# Patient Record
Sex: FEMALE | HISPANIC OR LATINO
[De-identification: names, ages, dates, MRNs, and addresses within clinical notes are randomized per-mention and may not be internally consistent; named-entity substitution may affect disease eponyms.]

---

## 2017-08-28 ENCOUNTER — CHARTING TRANS (OUTPATIENT)
Dept: OTHER | Age: 57
End: 2017-08-28

## 2017-08-30 ENCOUNTER — LAB SERVICES (OUTPATIENT)
Dept: OTHER | Age: 57
End: 2017-08-30

## 2017-08-30 ENCOUNTER — CHARTING TRANS (OUTPATIENT)
Dept: OTHER | Age: 57
End: 2017-08-30

## 2017-08-30 LAB — SKIN TEST, TB IN-OFFICE: NEGATIVE

## 2018-10-03 ENCOUNTER — HOSPITAL (OUTPATIENT)
Dept: OTHER | Age: 58
End: 2018-10-03
Attending: EMERGENCY MEDICINE

## 2018-10-03 LAB
ALBUMIN SERPL-MCNC: 3.8 GM/DL (ref 3.6–5.1)
ALBUMIN/GLOB SERPL: 0.9 {RATIO} (ref 1–2.4)
ALP SERPL-CCNC: 131 UNIT/L (ref 45–117)
ALT SERPL-CCNC: 30 UNIT/L
ANALYZER ANC (IANC): NORMAL
ANION GAP SERPL CALC-SCNC: 14 MMOL/L (ref 10–20)
AST SERPL-CCNC: 21 UNIT/L
BASOPHILS # BLD: 0 THOUSAND/MCL (ref 0–0.3)
BASOPHILS NFR BLD: 0 %
BILIRUB SERPL-MCNC: 0.6 MG/DL (ref 0.2–1)
BUN SERPL-MCNC: 15 MG/DL (ref 6–20)
BUN/CREAT SERPL: 23 (ref 7–25)
CALCIUM SERPL-MCNC: 9.5 MG/DL (ref 8.4–10.2)
CHLORIDE: 104 MMOL/L (ref 98–107)
CHOLEST SERPL-MCNC: 151 MG/DL
CHOLEST/HDLC SERPL: 3.2 {RATIO}
CO2 SERPL-SCNC: 27 MMOL/L (ref 21–32)
CREAT SERPL-MCNC: 0.66 MG/DL (ref 0.51–0.95)
DIFFERENTIAL METHOD BLD: NORMAL
EOSINOPHIL # BLD: 0.1 THOUSAND/MCL (ref 0.1–0.5)
EOSINOPHIL NFR BLD: 1 %
ERYTHROCYTE [DISTWIDTH] IN BLOOD: 13.9 % (ref 11–15)
GLOBULIN SER-MCNC: 4.4 GM/DL (ref 2–4)
GLUCOSE SERPL-MCNC: 96 MG/DL (ref 65–99)
HDLC SERPL-MCNC: 47 MG/DL
HEMATOCRIT: 40.6 % (ref 36–46.5)
HGB BLD-MCNC: 13.5 GM/DL (ref 12–15.5)
LDLC SERPL CALC-MCNC: 81 MG/DL
LIPASE SERPL-CCNC: 210 UNIT/L (ref 73–393)
LYMPHOCYTES # BLD: 2 THOUSAND/MCL (ref 1–4)
LYMPHOCYTES NFR BLD: 34 %
MCH RBC QN AUTO: 29.7 PG (ref 26–34)
MCHC RBC AUTO-ENTMCNC: 33.3 GM/DL (ref 32–36.5)
MCV RBC AUTO: 89.4 FL (ref 78–100)
MONOCYTES # BLD: 0.4 THOUSAND/MCL (ref 0.3–0.9)
MONOCYTES NFR BLD: 7 %
NEUTROPHILS # BLD: 3.5 THOUSAND/MCL (ref 1.8–7.7)
NEUTROPHILS NFR BLD: 58 %
NEUTS SEG NFR BLD: NORMAL %
NONHDLC SERPL-MCNC: 104 MG/DL
NRBC (NRBCRE): NORMAL
PLATELET # BLD: 270 THOUSAND/MCL (ref 140–450)
POTASSIUM SERPL-SCNC: 4 MMOL/L (ref 3.4–5.1)
PROT SERPL-MCNC: 8.2 GM/DL (ref 6.4–8.2)
RBC # BLD: 4.54 MILLION/MCL (ref 4–5.2)
SODIUM SERPL-SCNC: 141 MMOL/L (ref 135–145)
TRIGLYCERIDE (TRIGP): 115 MG/DL
TROPONIN I SERPL HS-MCNC: <0.02 NG/ML
TROPONIN I SERPL HS-MCNC: <0.02 NG/ML
WBC # BLD: 6 THOUSAND/MCL (ref 4.2–11)

## 2021-08-12 ENCOUNTER — WALK IN (OUTPATIENT)
Dept: URGENT CARE | Age: 61
End: 2021-08-12

## 2021-08-12 DIAGNOSIS — Z11.1 ENCOUNTER FOR SCREENING FOR RESPIRATORY TUBERCULOSIS: Primary | ICD-10-CM

## 2021-08-12 PROCEDURE — 86580 TB INTRADERMAL TEST: CPT | Performed by: NURSE PRACTITIONER

## 2021-08-14 ENCOUNTER — WALK IN (OUTPATIENT)
Dept: URGENT CARE | Age: 61
End: 2021-08-14

## 2021-08-14 VITALS
HEART RATE: 80 BPM | SYSTOLIC BLOOD PRESSURE: 120 MMHG | HEIGHT: 61 IN | DIASTOLIC BLOOD PRESSURE: 70 MMHG | RESPIRATION RATE: 20 BRPM | TEMPERATURE: 98.2 F | WEIGHT: 153 LBS | BODY MASS INDEX: 28.89 KG/M2

## 2021-08-14 DIAGNOSIS — Z11.1 SCREENING-PULMONARY TB: Primary | ICD-10-CM

## 2021-08-14 DIAGNOSIS — Z02.1 PRE-EMPLOYMENT EXAMINATION: Primary | ICD-10-CM

## 2021-08-14 LAB
INDURATION: NORMAL MM (ref 0–?)
INDURATION: NORMAL MM (ref 0–?)
SKIN TEST RESULT: NEGATIVE
SKIN TEST RESULT: NEGATIVE

## 2021-08-14 PROCEDURE — X0945 SELF PAY APN OR PA PERFORMED ADMINISTRATIVE PHYSICAL: HCPCS | Performed by: NURSE PRACTITIONER

## 2021-08-14 PROCEDURE — 86580 TB INTRADERMAL TEST: CPT | Performed by: NURSE PRACTITIONER

## 2021-08-14 ASSESSMENT — ENCOUNTER SYMPTOMS
RESPIRATORY NEGATIVE: 1
HEMATOLOGIC/LYMPHATIC NEGATIVE: 1
EYES NEGATIVE: 1
ALLERGIC/IMMUNOLOGIC NEGATIVE: 1
ENDOCRINE NEGATIVE: 1
CONSTITUTIONAL NEGATIVE: 1
NEUROLOGICAL NEGATIVE: 1
PSYCHIATRIC NEGATIVE: 1
GASTROINTESTINAL NEGATIVE: 1

## 2021-08-14 ASSESSMENT — VISUAL ACUITY
OS_CC: 20/30
OD_CC: 20/20

## 2021-09-02 ENCOUNTER — TELEPHONE (OUTPATIENT)
Dept: SCHEDULING | Age: 61
End: 2021-09-02

## 2021-11-09 ENCOUNTER — HOSPITAL ENCOUNTER (OUTPATIENT)
Dept: GENERAL RADIOLOGY | Facility: HOSPITAL | Age: 61
Discharge: HOME | End: 2021-11-09
Attending: PODIATRIST
Payer: COMMERCIAL

## 2021-11-09 ENCOUNTER — OFFICE VISIT (OUTPATIENT)
Dept: PODIATRY CLINIC | Facility: CLINIC | Age: 61
End: 2021-11-09
Payer: COMMERCIAL

## 2021-11-09 DIAGNOSIS — M72.2 PLANTAR FASCIITIS OF LEFT FOOT: ICD-10-CM

## 2021-11-09 DIAGNOSIS — M79.672 PAIN OF LEFT HEEL: ICD-10-CM

## 2021-11-09 DIAGNOSIS — M79.672 PAIN OF LEFT HEEL: Primary | ICD-10-CM

## 2021-11-09 PROCEDURE — 73630 X-RAY EXAM OF FOOT: CPT | Performed by: PODIATRIST

## 2021-11-09 PROCEDURE — 99203 OFFICE O/P NEW LOW 30 MIN: CPT | Performed by: PODIATRIST

## 2021-11-09 PROCEDURE — L3060 FOOT ARCH SUPP LONGITUD/META: HCPCS | Performed by: PODIATRIST

## 2021-11-09 RX ORDER — LEVOTHYROXINE SODIUM 0.03 MG/1
TABLET ORAL
COMMUNITY

## 2021-11-09 RX ORDER — CETIRIZINE HYDROCHLORIDE 10 MG/1
TABLET ORAL
COMMUNITY

## 2021-11-09 NOTE — PROGRESS NOTES
HPI:    Patient ID: Myrla Gilford is a 59year old female. Is an 22-year-old female presents as new patient to me on consult from [address] [place] [address]. Patient's chief complaint is left heel pain. She states it has been a problem for as much as 4 years.   It is unrela instructed to wear shoes at all times meaning no barefoot walking. I also instructed her to use ibuprofen 400 mg 3 times per day with meals. I reviewed the use of the medication, concerns, and expectations.   She was instructed to ice the heel twice every

## 2021-11-13 ENCOUNTER — TELEPHONE (OUTPATIENT)
Dept: PODIATRY CLINIC | Facility: CLINIC | Age: 61
End: 2021-11-13

## 2024-12-27 ENCOUNTER — OFFICE VISIT (OUTPATIENT)
Facility: LOCATION | Age: 64
End: 2024-12-27

## 2024-12-27 VITALS — WEIGHT: 154 LBS | BODY MASS INDEX: 28.34 KG/M2 | HEIGHT: 62 IN

## 2024-12-27 DIAGNOSIS — H61.23 BILATERAL IMPACTED CERUMEN: Primary | ICD-10-CM

## 2024-12-27 DIAGNOSIS — J35.8 TONSILLAR CYST: ICD-10-CM

## 2024-12-27 DIAGNOSIS — R05.3 CHRONIC COUGH: ICD-10-CM

## 2024-12-27 DIAGNOSIS — R13.10 ODYNOPHAGIA: ICD-10-CM

## 2024-12-27 PROCEDURE — 1160F RVW MEDS BY RX/DR IN RCRD: CPT | Performed by: STUDENT IN AN ORGANIZED HEALTH CARE EDUCATION/TRAINING PROGRAM

## 2024-12-27 PROCEDURE — 1159F MED LIST DOCD IN RCRD: CPT | Performed by: STUDENT IN AN ORGANIZED HEALTH CARE EDUCATION/TRAINING PROGRAM

## 2024-12-27 PROCEDURE — 31575 DIAGNOSTIC LARYNGOSCOPY: CPT | Performed by: STUDENT IN AN ORGANIZED HEALTH CARE EDUCATION/TRAINING PROGRAM

## 2024-12-27 PROCEDURE — 3008F BODY MASS INDEX DOCD: CPT | Performed by: STUDENT IN AN ORGANIZED HEALTH CARE EDUCATION/TRAINING PROGRAM

## 2024-12-27 PROCEDURE — 69210 REMOVE IMPACTED EAR WAX UNI: CPT | Performed by: STUDENT IN AN ORGANIZED HEALTH CARE EDUCATION/TRAINING PROGRAM

## 2024-12-27 PROCEDURE — 99203 OFFICE O/P NEW LOW 30 MIN: CPT | Performed by: STUDENT IN AN ORGANIZED HEALTH CARE EDUCATION/TRAINING PROGRAM

## 2024-12-27 RX ORDER — PANTOPRAZOLE SODIUM 40 MG/1
40 TABLET, DELAYED RELEASE ORAL DAILY
COMMUNITY
Start: 2024-11-12

## 2024-12-27 RX ORDER — FLUTICASONE PROPIONATE 50 MCG
SPRAY, SUSPENSION (ML) NASAL
COMMUNITY
Start: 2022-12-09

## 2024-12-27 NOTE — PROGRESS NOTES
[facility]  OTOLARYNGOLOGY - HEAD & NECK SURGERY    12/27/2024     Reason for Consultation:   Throat issues    History of Present Illness:   Patient is a pleasant 64 year old female who is being seen for throat problems over the past few months.  The patient states that she has been treated multiple times for strep with antibiotics but this has not helped.  She continues to believe that she has strep because she sees a white spot on the left tonsil.  She also has a lot of mucus formation and feels of a burning in her chest at times.  She has been prescribed pantoprazole but takes it irregularly.  She is trying to avoid spicy foods, but drinks tea at night and also drinks milk right before bed.  She is also had some discomfort in her left ear.  She has gargled with bicarbonate which has helped slightly.  Overall her throat pain has improved over the past few weeks.    Past Medical History  Past Medical History:    Hypothyroidism    Prediabetes       Past Surgical History  Past Surgical History:   Procedure Laterality Date    Hysterectomy  06/28/2023    partial hysterectomy       Family History  History reviewed. No pertinent family history.    Social History  Pediatric History   Patient Parents    Not on file     Other Topics Concern    Not on file   Social History Narrative    Not on file           Current Medications:  Current Outpatient Medications   Medication Sig Dispense Refill    fluticasone propionate 50 MCG/ACT Nasal Suspension Spray 2 sprays every day by intranasal route.      pantoprazole 40 MG Oral Tab EC Take 1 tablet (40 mg total) by mouth daily.      Cholecalciferol 50 MCG (2000 UT) Oral Tab Vitamin D3 50 mcg (2,000 unit) tablet   Take 1 tablet every day by oral route.      levothyroxine 25 MCG Oral Tab levothyroxine 25 mcg tablet      cetirizine 10 MG Oral Tab cetirizine 10 mg tablet (Patient not taking: Reported on 12/27/2024)         Allergies  Allergies[1]    Review of Systems:   A comprehensive 10  point review of systems was completed.  Pertinent positives and negatives noted in the the HPI.    Physical Exam:   Height 5' 2\" (1.575 m), weight 154 lb (69.9 kg).    GENERAL: No acute distress, Comfortable appearing  FACE: HB 1/6, Normal Animation  HEAD: Normocephalic  EYES: EOMI, pupils equil  EARS: Bilateral Auricles Symmetric  NOSE: Nares patent bilaterally  ORAL CAVITY: Tongue mobile, Oropharynx clear, Floor of mouth clear, Posterior oropharynx normal  NECK: No palpable lymphadenopathy, thyroid not palpable, nontender    OTOMICROSCOPY WITH CERUMEN REMOVAL    Canals:  Right: Canal with cerumen preventing adequate view of TM, debrided with instrumentation as dictated below  Left: Canal with cerumen preventing adequate view of TM, debrided with instrumentation as dictated below    Tympanic Membranes:  Right: Normal tympanic membrane.   Left: Normal tympanic membrane.     TM Visualized Method:   Right TM examined via otomicroscopy.    Left TM examined via otomicroscopy.      PROCEDURE: REMOVAL OF CERUMEN IMPACTION  The cerumen impaction was completely removed from the bilateral ear canals using microscopy as necessary.   Removal was completed by using a mary daugherty, and pick    PROCEDURE: FLEXIBLE FIBEROPTIC LARYNGOSCOPY (67053)    Due to inability for adequate examination of the larynx and need for magnification to perform the examination, endoscopy was performed.  Risks and benefits were discussed with patient/family and they have given verbal consent to proceed.    Procedure Detail & Findings:     After placement of topical anesthetic intranasally the flexible laryngoscope was inserted into the nare and driven through the nasal cavity with no significant abnormal findings noted in the nasal cavities or nasopharynx. The oropharynx, hypopharynx and larynx were subsequently examined and the following findings were noted:    Base of Tongue: Normal    Valeculla: Normal    Arytenoids: Normal    Introitus of  the esophagus: Moderate edema pachydermia noted    Epiglottis: Normal    False vocal cords: Normal    True vocal cords: Normal    Subglottic space: Normal    Mobility of True vocal cords: Normal    Condition: Stable    Complications: Patient tolerated the procedure well with no immediate complication.      Results:     Laboratory Data:  No results found for: \"WBC\", \"HGB\", \"HCT\", \"PLT\", \"CREATSERUM\", \"BUN\", \"NA\", \"K\", \"CL\", \"CO2\", \"GLU\", \"CA\", \"ALB\", \"ALKPHO\", \"TP\", \"AST\", \"ALT\", \"PTT\", \"INR\", \"PTP\", \"T4F\", \"TSH\", \"TSHREFLEX\", \"DAMIAN\", \"LIP\", \"GGT\", \"PSA\", \"DDIMER\", \"ESRML\", \"ESRPF\", \"CRP\", \"BNP\", \"MG\", \"PHOS\", \"TROP\", \"CK\", \"CKMB\", \"SHAWN\", \"RPR\", \"B12\", \"ETOH\", \"POCGLU\"      Imaging:  No results found.      Impression:       ICD-10-CM    1. Bilateral impacted cerumen  H61.23       2. Chronic cough  R05.3       3. Odynophagia  R13.10       4. Tonsillar cyst  J35.8            Recommendations:  I have debrided the bilateral ear canals today and both ear exams appear normal  In terms of her sore throat I do think that there is a large component of laryngal pharyngeal reflux.  She has been prescribed PPI but has been taking it infrequently and only as needed.  I would like her to take this daily over the next 30 days  I have sent a throat culture to check but my suspicion for throat infection is quite low given the chronicity of the problem  She will return to see me in 1 month and we will follow-up cultures.    Thank you for allowing me to participate in the care of your patient.    Jose Ridley,    Otolaryngology/Rhinology, Sinus, and Endoscopic Skull Base Surgery  [address] [place] [address]  Phone [phone]  Fax [phone]  12/27/2024  10:15 AM  12/27/2024          [1] No Known Allergies

## 2025-01-16 NOTE — PROGRESS NOTES
Evon Iyer is a 65 year old female.  Chief Complaint   Patient presents with    Establish Care     HPI:   Patient presented today for establishment of care.  She states that she has been having pain in her left breast area.  Pain is mostly in the inner upper quadrant but it radiates to the whole breast.  It comes and goes.  She was seen by her previous physician and underwent diagnostic mammogram which was normal she also had a chest x-ray done which was normal.  She does have a birthmark in that area but states that it has not changed.    Patient also complains of constipation that has been ongoing.  But usually has 1 bowel movement a day but it is very hard.  Has been increasing her fluid intake also takes prune juice.    She states she is worried that her thyroid is not working well she takes 25 mg tablets.  She has noticed hair fall dry skin and constipation and is tired all the time wondering if it is because of her thyroid levels    She brought her most recent blood work with her which was reviewed with the patient  Constipation- uses once a day. BM is hard      Current Outpatient Medications   Medication Sig Dispense Refill    pantoprazole 40 MG Oral Tab EC Take 1 tablet (40 mg total) by mouth every morning before breakfast. 90 tablet 3    Cholecalciferol 50 MCG (2000 UT) Oral Tab Take 1 tablet (2,000 Units total) by mouth daily. 90 tablet 3    docusate sodium 100 MG Oral Cap Take 1 capsule (100 mg total) by mouth 2 (two) times daily as needed for constipation. 30 capsule 0    levothyroxine 25 MCG Oral Tab levothyroxine 25 mcg tablet      fluticasone propionate 50 MCG/ACT Nasal Suspension Spray 2 sprays every day by intranasal route. (Patient not taking: Reported on 1/16/2025)      cetirizine 10 MG Oral Tab cetirizine 10 mg tablet (Patient not taking: Reported on 11/9/2021)        Past Medical History:    Hypothyroidism    Prediabetes      Past Surgical History:   Procedure Laterality Date     Hysterectomy  06/28/2023    partial hysterectomy      Social History:  Social History     Socioeconomic History    Marital status: Unknown   Tobacco Use    Smoking status: Never    Smokeless tobacco: Never   Vaping Use    Vaping status: Never Used   Substance and Sexual Activity    Alcohol use: Never    Drug use: Never      History reviewed. No pertinent family history.   Allergies[1]     REVIEW OF SYSTEMS:   Review of Systems   Review of Systems   Constitutional: Negative for activity change, appetite change and fever.   HENT: Negative for congestion and voice change.    Respiratory: Negative for cough and shortness of breath.    Cardiovascular: Negative for chest pain.   Gastrointestinal: Negative for abdominal distention, abdominal pain and vomiting.   Genitourinary: Negative for hematuria.   Skin: Negative for wound.   Psychiatric/Behavioral: Negative for behavioral problems.   Wt Readings from Last 5 Encounters:   01/16/25 153 lb (69.4 kg)   12/27/24 154 lb (69.9 kg)     Body mass index is 31.98 kg/m².      EXAM:   /78   Pulse 65   Ht 4' 10\" (1.473 m)   Wt 153 lb (69.4 kg)   BMI 31.98 kg/m²   Physical Exam   Constitutional:       Appearance: Normal appearance.   HENT:      Head: Normocephalic.   Eyes:      Conjunctiva/sclera: Conjunctivae normal.   Cardiovascular:      Rate and Rhythm: Normal rate and regular rhythm.      Heart sounds: Normal heart sounds. No murmur heard.  Pulmonary:      Effort: Pulmonary effort is normal.      Breath sounds: Normal breath sounds. No rhonchi or rales.   Abdominal:      General: Bowel sounds are normal.      Palpations: Abdomen is soft.      Tenderness: There is no abdominal tenderness.   Musculoskeletal:      Cervical back: Neck supple.      Right lower leg: No edema.      Left lower leg: No edema.   Skin:     General: Skin is warm and dry.   Neurological:      General: No focal deficit present.      Mental Status: He is alert and oriented to person, place, and  time. Mental status is at baseline.   Psychiatric:         Mood and Affect: Mood normal.         Behavior: Behavior normal.       ASSESSMENT AND PLAN:   1. Acquired hypothyroidism  - continue synthroid  - TSH W Reflex To Free T4 [E]; Future    2. Breast pain, left  -Diagnostic mammogram from October reviewed with the patient.  No abnormalities  -Chest x-ray from December also reviewed which was normal  -Will refer patient to breast specialist for further input.  - Surg Onc Breast Referral - In Network  -Tylenol as needed    3. Skin anomaly  4. Screening for skin cancer  -Dermatology referral for skin check  - Derm Referral - In Network    5. Screening for diabetes mellitus  -Check hemoglobin A1c    6. Gastroesophageal reflux disease without esophagitis  -Continue Protonix          The patient indicates understanding of these issues and agrees to the plan.  Return in 3 months for physical    Marquita Rebecca Gomes MD        [1] No Known Allergies

## 2025-01-18 NOTE — TELEPHONE ENCOUNTER
New prescription request from [facility] for:      pantoprazole 40 MG Oral Tab EC, Take 1 tablet (40 mg total) by mouth every morning before breakfast., Disp: 90 tablet, Rfl: 3      docusate sodium 100 MG Oral Cap, Take 1 capsule (100 mg total) by mouth 2 (two) times daily as needed for constipation., Disp: 30 capsule, Rfl: 0      fluticasone propionate 50 MCG/ACT Nasal Suspension, Spray 2 sprays every day by intranasal route. (Patient not taking: Reported on 1/16/2025), Disp: , Rfl:       Cholecalciferol 50 MCG (2000 UT) Oral Tab, Take 1 tablet (2,000 Units total) by mouth daily., Disp: 90 tablet, Rfl: 3

## 2025-01-20 NOTE — TELEPHONE ENCOUNTER
Regine Mccormick  1/17/2025  3:18 PM CST Back to Top      Called patient, no answer. Left voice message to return call    Marquita Gomes MD  1/17/2025  3:15 PM CST       Normal thyroid test  Please let patient know     Patient called back for lab results, reviewed above with her and no other questions about results.    Patient asking how she can see these in [employer]. She says she has a mychart, her record here shows not mychart active.     Has MC with [facility].   Provided number to [email or web address] help desk for assistance. Also given number to contact them if needed or disconnected.     She was transferred with  on the line.     ID   [id number]

## 2025-01-22 NOTE — TELEPHONE ENCOUNTER
Dr Gomes,    Fluticasone refill request. Medication externally reported. Please advise. Orders pended if appropriate

## 2025-01-23 NOTE — TELEPHONE ENCOUNTER
Spoke with [facility] on 1/23/2025 and verified patient did not  recently dispensed medications of Pantoprazole 40 mg, Chloecalciferol 50 mcg and Docusate Sodium 100 mg.

## 2025-01-23 NOTE — TELEPHONE ENCOUNTER
Spoke with patient, Date of Birth verified  She requested pended meds to be send to new pharm [facility].  Routed to [facility] to run for protocol , thanks.         Requested Prescriptions     Pending Prescriptions Disp Refills    Cholecalciferol 50 MCG (2000 UT) Oral Tab 90 tablet 3     Sig: Take 1 tablet (2,000 Units total) by mouth daily.    docusate sodium 100 MG Oral Cap 30 capsule 0     Sig: Take 1 capsule (100 mg total) by mouth 2 (two) times daily as needed for constipation.    levothyroxine 25 MCG Oral Tab 90 tablet 3     Sig: Take 1 tablet (25 mcg total) by mouth before breakfast.    pantoprazole 40 MG Oral Tab EC 90 tablet 3     Sig: Take 1 tablet (40 mg total) by mouth every morning before breakfast.    cetirizine 10 MG Oral Tab 90 tablet 3     Sig: Take 1 tablet (10 mg total) by mouth daily.       Last Office Visit with PCP: 1/16/2025

## 2025-01-23 NOTE — TELEPHONE ENCOUNTER
Please Review. Protocol Failed; No Protocol   Please advise medication is patient external reported or historical.     Requested Prescriptions   Pending Prescriptions Disp Refills    cetirizine 10 MG Oral Tab 90 tablet 3     Sig: Take 1 tablet (10 mg total) by mouth daily.       Allergy Medication Protocol Passed - 1/23/2025  5:05 PM        Passed - In person appointment or virtual visit in the past 12 mos or appointment in next 3 mos     Recent Outpatient Visits              1 week ago Acquired hypothyroidism    [facility], Yasmin Irfan, MD    Office Visit    3 weeks ago [facility]    [facility]    3 [facility]    [facility]                      Passed - Medication is active on med list          levothyroxine 25 MCG Oral Tab  0       There is no refill protocol information for this order      Signed Prescriptions Disp Refills    Cholecalciferol 50 MCG (2000 UT) Oral Tab 90 tablet 3     Sig: Take 1 tablet (2,000 Units total) by mouth daily.       There is no refill protocol information for this order       docusate sodium 100 MG Oral Cap 30 capsule 0     Sig: Take 1 capsule (100 mg total) by mouth 2 (two) times daily as needed for constipation.       Gastrointestional Medication Protocol Passed - 1/23/2025  5:05 PM        Passed - In person appointment or virtual visit in the past 12 mos or appointment in next 3 mos     Recent Outpatient Visits              1 week ago Acquired hypothyroidism    [facility] Kagzi, Yasmin Irfan, MD    Office Visit    3 weeks ago Bilateral impacted cerumen    [facility]    Office Visit    3 years [facility] left [facility]    Office Visit                      Passed - Medication is active on med list          pantoprazole 40 MG Oral Tab EC 90 tablet 3     Sig: Take 1 tablet (40 mg total) by mouth every morning before breakfast.       Gastrointestional Medication Protocol Passed - 2025  5:05 PM        Passed - In person appointment or virtual visit in the past 12 mos or appointment in next 3 mos     Recent Outpatient Visits              1 week ago Acquired hypothyroidism    [facility], Yasmin Irfan, MD    Office Visit    3 weeks ago [facility]     [facility]    3 [facility] left [facility]    Office Visit                      Passed - Medication is active on med list         Refused Prescriptions Disp Refills    fluticasone propionate 50 MCG/ACT Nasal Suspension 48 g 3     Si sprays by Nasal route daily.       Allergy Medication Protocol Passed - 2025  5:05 PM        Passed - In person appointment or virtual visit in the past 12 mos or appointment in next 3 mos     Recent Outpatient Visits              1 week [facility]    [facility] Visit    3 weeks ago Bilateral impacted cerumen    [facility] Jose Ridley,     Office Visit    3 years ago Pain of left heel    [facility] [place], [place]    Office Visit                      Passed - Medication is active on med list                 Recent Outpatient Visits              [facility] week [facility]    Office Visit    3 weeks ago Bilateral impacted cerumen    [facility] Jose Ridley DO    Office Visit    3 years ago Pain of left heel    [facility]    Office Visit

## 2025-01-23 NOTE — TELEPHONE ENCOUNTER
fluticasone propionate 50 MCG/ACT Nasal Suspension 48 g 3 1/22/2025 --    Sig - Route: 2 sprays by Nasal route daily. - Nasal    Sent to pharmacy as: Fluticasone Propionate 50 MCG/ACT Nasal Suspension (Flonase)    E-Prescribing Status: Receipt confirmed by pharmacy (1/22/2025  1:23 PM CST)      Pharmacy    [facility] - [facility] [address] [facility] [phone], [phone]      Disp Refills Start End    pantoprazole 40 MG Oral Tab EC 90 tablet 3 1/16/2025 --    Sig - Route: Take 1 tablet (40 mg total) by mouth every morning before breakfast. - Oral    Sent to pharmacy as: Pantoprazole Sodium 40 MG Oral Tablet Delayed Release (Protonix)    E-Prescribing Status: Receipt confirmed by pharmacy (1/16/2025  4:14 PM CST)      Pharmacy    [id number] - [place], [place] - [address] [phone], [phone]     Cholecalciferol 50 MCG (2000 UT) Oral Tab 90 tablet 3 1/16/2025 --    Sig - Route: Take 1 tablet (2,000 Units total) by mouth daily. - Oral    Sent to pharmacy as: Cholecalciferol 50 MCG (2000 UT) Oral Tablet    E-Prescribing Status: Receipt confirmed by pharmacy (1/16/2025  4:14 PM CST)      Pharmacy    [id number] - [facility] [address] [phone], [phone]      Disp Refills Start End    docusate sodium 100 MG Oral Cap 30 capsule 0 1/16/2025 1/23/2025    Sig - Route: Take 1 capsule (100 mg total) by mouth 2 (two) times daily as needed for constipation. - Oral    Sent to pharmacy as: Docusate Sodium 100 MG Oral Capsule (Colace)    E-Prescribing Status: Receipt confirmed by pharmacy (1/16/2025  4:17 PM CST)      Pharmacy    [id number] - [place], [place] - [address] [phone], [phone]

## 2025-03-10 NOTE — PROGRESS NOTES
Subjective:   Evon Diop is a 65 year old female who presents for a MA AHA (Medicare Advantage Annual Health Assessment) and IPPE (Initial Preventative Physical Exam) (Welcome to Medicare- < 12 months on Medicare) and scheduled follow up of multiple significant but stable problems.      Patient seen and examined. She states that she still has pain in her left breast. She is not able to see the breast doctor yet because she did not have the CDs of her mammogram and she does not know how to obtain them .    Sometimes feels pain is in the chest also. Has been getting short of breath with increased activity. No ankle edema, feels tired easily.   Has noticed itching and rash under the breast area.     History/Other:   Fall Risk Assessment:   She has been screened for Falls and is High Risk. Fall Prevention information provided to patient in After Visit Summary.    Do you feel unsteady when standing or walking?: No  Do you worry about falling?: No  Have you fallen in the past year?: Yes  How many times have you fallen?: 2  Were you injured?: No     Cognitive Assessment:   She had a completely normal cognitive assessment - see flowsheet entries     Functional Ability/Status:   Evon Diop has some abnormal functions as listed below:  She has Hearing problems based on screening of functional status.She has Vision problems based on screening of functional status.       Depression Screening (PHQ):  PHQ-2 SCORE: 0  , done 3/10/2025        5 minutes spent screening and counseling for depression    Advanced Directives:   She does NOT have a Living Will. [Do you have a living will?: No]  She does NOT have a Power of  for Health Care. [Do you have a healthcare power of ?: No]  Discussed Advance Care Planning with patient (and family/surrogate if present). Standard forms made available to patient in After Visit Summary.      There is no problem list on file for this patient.    Allergies:  She  has No Known Allergies.    Current Medications:  Outpatient Medications Marked as Taking for the 3/10/25 encounter (Office Visit) with Marquita Gomes MD   Medication Sig    nystatin 100,000 Units/g External Cream Apply a small amount twice a day.    levothyroxine 50 MCG Oral Tab Take 1 tablet (50 mcg total) by mouth before breakfast.    Cholecalciferol 50 MCG (2000 UT) Oral Tab Take 1 tablet (2,000 Units total) by mouth daily.    pantoprazole 40 MG Oral Tab EC Take 1 tablet (40 mg total) by mouth every morning before breakfast.    cetirizine 10 MG Oral Tab Take 1 tablet (10 mg total) by mouth daily.    levothyroxine 25 MCG Oral Tab Take 1 tablet (25 mcg total) by mouth before breakfast. (Patient taking differently: Take 2 tablets (50 mcg total) by mouth before breakfast.)    fluticasone propionate 50 MCG/ACT Nasal Suspension 2 sprays by Nasal route daily.       Medical History:  She  has a past medical history of Hypothyroidism and Prediabetes.  Surgical History:  She  has a past surgical history that includes hysterectomy (06/28/2023).   Family History:  Her family history is not on file.  Social History:  She  reports that she has never smoked. She has never used smokeless tobacco. She reports that she does not drink alcohol and does not use drugs.    Tobacco:  She has never smoked tobacco.    CAGE Alcohol Screen:   CAGE screening score of 0 on 3/10/2025, showing low risk of alcohol abuse.      Patient Care Team:  Manasa Franco MD as PCP - General (Family Medicine)    Review of Systems  GENERAL: feels well otherwise  SKIN: denies any unusual skin lesions  EYES: denies blurred vision or double vision  HEENT: denies nasal congestion, sinus pain or ST  LUNGS: denies shortness of breath with exertion  CARDIOVASCULAR: denies chest pain on exertion  GI: denies abdominal pain, denies heartburn  : denies dysuria, vaginal discharge or itching, no complaint of urinary incontinence   MUSCULOSKELETAL: denies back  pain  NEURO: denies headaches  PSYCHE: denies depression or anxiety  HEMATOLOGIC: denies hx of anemia  ENDOCRINE: denies thyroid history  ALL/ASTHMA: denies hx of allergy or asthma    Objective:   Physical Exam  General Appearance:  Alert, cooperative, no distress, appears stated age   Head:  Normocephalic, without obvious abnormality, atraumatic   Eyes:  PERRL, conjunctiva/corneas clear, EOM's intact both eyes   Ears:  Normal TM's and external ear canals, both ears   Nose: Nares normal, septum midline,mucosa normal, no drainage or sinus tenderness   Throat: Lips, mucosa, and tongue normal; teeth and gums normal   Neck: Supple, symmetrical, trachea midline, no adenopathy;  thyroid: not enlarged, symmetric, no tenderness/mass/nodules; no carotid bruit or JVD   Back:   Symmetric, no curvature, ROM normal, no CVA tenderness   Lungs:   Clear to auscultation bilaterally, respirations unlabored   Heart:  Regular rate and rhythm, S1 and S2 normal, no murmur, rub, or gallop   Abdomen:   Soft, non-tender, bowel sounds active all four quadrants,  no masses, no organomegaly   Pelvic: Deferred   Extremities: Extremities normal, atraumatic, no cyanosis or edema   Pulses: 2+ and symmetric   Skin: Skin color, texture, turgor normal, no rashes or lesions   Lymph nodes: Cervical, supraclavicular, and axillary nodes normal   Neurologic: Normal       /76   Pulse 73   Ht 4' 10\" (1.473 m)   Wt 153 lb (69.4 kg)   BMI 31.98 kg/m²  Estimated body mass index is 31.98 kg/m² as calculated from the following:    Height as of this encounter: 4' 10\" (1.473 m).    Weight as of this encounter: 153 lb (69.4 kg).    Medicare Hearing Assessment:   Hearing Screening    Time taken: 3/10/2025  4:04 PM  Screening Method: Finger Rub  Finger Rub Result: Pass         Visual Acuity:   Right Eye Visual Acuity: Uncorrected Right Eye Chart Acuity: 20/40   Left Eye Visual Acuity: Uncorrected Left Eye Chart Acuity: 20/40   Both Eyes Visual Acuity:  Uncorrected Both Eyes Chart Acuity: 20/40            Assessment & Plan:   Evon Diop is a 65 year old female who presents for a Medicare Assessment.     1. Annual physical exam (Primary)  - check fasting labs   - immunizations reviewed and up to date  -     Comp Metabolic Panel (14); Future; Expected date: 03/10/2025  -     Lipid Panel; Future; Expected date: 03/10/2025  -     TSH W Reflex To Free T4; Future; Expected date: 03/10/2025  -     CBC With Differential With Platelet; Future; Expected date: 03/10/2025    2. Left-sided chest pain  -     CARD NUC STRESS TEST LEXISCAN (CPT 76752/09955/); Future; Expected date: 03/10/2025  3. Breast pain, left        - Patient to set up appointment with breast specialist. Also patient to call [facility] to obtain CDs of her mammogram   5. Screening for diabetes mellitus  -     Hemoglobin A1C; Future; Expected date: 03/10/2025  6. Post-menopausal  -     XR DEXA BONE DENSITOMETRY (CPT=77080); Future; Expected date: 03/10/2025  7. Acquired hypothyroidism        - continue 50 mcg of synthroid        - check labs   8. Class 1 obesity due to excess calories without serious comorbidity with body mass index (BMI) of 31.0 to 31.9 in adult       - diet and lifestyle modifications  9. Fungal rash of trunk       - nystatin cream   Other orders  -     Nystatin; Apply a small amount twice a day.  Dispense: 30 g; Refill: 0  -     Levothyroxine Sodium; Take 1 tablet (50 mcg total) by mouth before breakfast.  Dispense: 90 tablet; Refill: 3    The patient indicates understanding of these issues and agrees to the plan.  Reinforced healthy diet, lifestyle, and exercise.      No follow-ups on file.     Marquita Gomes MD, 3/10/2025     Supplementary Documentation:   General Health:  In the past six months, have you lost more than 10 pounds without trying?: 2 - No  Has your appetite been poor?: No  Type of Diet: Balanced  How does the patient maintain a good energy level?: Daily  Walks  How would you describe your daily physical activity?: None  How would you describe your current health state?: Good  How do you maintain positive mental well-being?: Social Interaction  On a scale of 0 to 10, with 0 being no pain and 10 being severe pain, what is your pain level?: 5 - (Moderate)  In the past six months, have you experienced urine leakage?: 1-Yes  At any time do you feel concerned for the safety/well-being of yourself and/or your children, in your home or elsewhere?: No  Have you had any immunizations at another office such as Influenza, Hepatitis B, Tetanus, or Pneumococcal?: No    Health Maintenance   Topic Date Due    Colorectal Cancer Screening  Never done    DEXA Scan  Never done    COVID-19 Vaccine (3 - 2024-25 season) 09/01/2024    Annual Well Visit  Never done    Mammogram  10/14/2025    Influenza Vaccine  Completed    Annual Depression Screening  Completed    Fall Risk Screening (Annual)  Completed    Pneumococcal Vaccine: 50+ Years  Completed    Zoster Vaccines  Completed    Meningococcal B Vaccine  Aged Out

## 2025-03-28 NOTE — TELEPHONE ENCOUNTER
Called the patient no answer VM left regarding submitting her previous mammogram results to Dr. Luciano's office in order for them to schedule appointment for her.

## 2025-03-28 NOTE — TELEPHONE ENCOUNTER
Noted spoke with patient regarding appointment Dr. Luciano's office, she is waiting for a call back.

## 2025-03-28 NOTE — TELEPHONE ENCOUNTER
Please call patient. She needs to submit her previous mammogram results to Dr. Luciano's office in order for them to schedule appointment for her. I received a message from their APN that patient is getting upset because they are asking for previous mammogram reports with CDs, which is the protocol for scheduling appointment with breast specilaist. If she is going to Dr. Glover, protocol will still be same.

## 2025-03-28 NOTE — TELEPHONE ENCOUNTER
Please see encounter from today, patient called back patient said the dr she is wanting to book is booked out till May. Requesting call back can call anytime     Relayed messaged below.

## 2025-04-09 NOTE — PATIENT INSTRUCTIONS
Bilateral breast ultrasound has been ordered for October 2025  Next mammogram has been ordered for anytime  Return to clinic if symptoms do not get better in 6 months to 1 year         Breast pain is very common. A survey of women found that almost half had mild breast pain, and about 1/5 had severe breast pain, although most had not reported these symptoms to their doctor. Breast pain is the most common breast-related symptoms for which patients seek medical treatment, and accounts for about half of the breast-related office visits. Luckily, rarely is breast pain due to cancer.    Most commonly, breast pain is cyclical, meaning that it is related to normal hormonal fluctuations of the menstrual cycle. Usually it is felt the week prior to the start of menses, and resolves after the end of menses.  Hormonal breast pain tends to be in both breasts and most severe in the upper and outer aspects of the breast. Minor cyclical breast discomfort is very normal. However, in a small number of women, this cyclical pain can be moderate to severe, affecting day-to-day activities.    Noncyclical breast pain does not follow the usual menstrual pattern.  It tends to be on one side and in different locations of the breast.  There are many causes of noncyclical/non-hormonal breast pain:     Large breasts: large breasts tend to pull on the ligaments of the breast, and discomfort may involve the neck, back, and shoulder as well.   Diet: many patients report that cutting back on caffeine has greatly improved their breast pain.  Also, a low-fat, high complex carbohydrate diet has been shown to be helpful in some small studies.   Smoking: smoking might increase breast pain by increasing epinephrine levels in the breast. Epinephrine is involved in the perception of pain.   Medication: up to one third of women taking postmenopausal hormone therapy may experience some degree of noncyclical breast pain.  Luckily, this tends to resolve over  time.  Other medications can also contribute to pain, including some antidepressants, cardiovascular agents, and antibiotics.   Chest wall pain: This is most commonly from irritation of the pectoralis major muscle.  Examples of activities that can cause this muscle pain include waterskiing, raking, rowing, and shoveling.   Pregnancy: it is important to consider pregnancy as a common cause of breast pain.    Treatments   Wear a supportive bra as much as possible.   Apply heat to the breast with a heating pad.   Take over-the-counter pain relievers as needed (Acetaminophen/Tylenol and/or Ibuprofen/Advil).   Avoid caffeine. Well designed studies have not shown that avoiding caffeine can treat breast pain.  However, many women report significant improvement in their symptoms when they reduce their intake of tea, coffee, chocolate, and energy drinks.   Try Evening Primrose Oil (1,000mg to 1,500mg per day). Studies have not consistently shown that evening primrose oil is helpful, but some women have reported relief. Evening Primrose Oil is found over-the-counter. It may cause side effects such as nausea, diarrhea, and headaches.   Try Vitamin E (400 International unit per day). Studies have not consistently shown benefits of Vitamin E for treating breast pain, though some women find it helpful. Using Vitamin E for a few weeks is unlikely to cause any harm, but it should not be used for the long-term.   Try Omega-3 fatty acid.  Though not proven to be effect in rigorous studies, some women find increased intake of fish oils/omega-3 supplements to be helpful. Natural dietary sources include: dark green leafy vegetables, wild-caught cold-water fish, flax, walnuts, and sesame. Omega-3 supplements are also available by prescription and over-the-counter.   Give it time. Most commonly, pain goes away on its own after a few months, without the need for any treatment.

## 2025-04-09 NOTE — CONSULTS
Breast Surgery New Patient Consultation    Evon Diop is a 65 year old patient, referred by Dr. Gomes, who presents for evaluation of left breast pain.    History of Present Illness:   Ms. Evon Diop is a 65 year old woman with a past history significant for hypothyroidism and prediabetes who presents for evaluation of left breast pain.     She states her pain started about one year ago. It is intermittent and only in the left breast. It comes and goes. Sometimes wearing a supportive bra helps. It is mostly in the medial left breast.     She underwent left breast diagnostic imaging at an outside institution in 2024 that showed no suspicious findings. She has density C breast tissue. She thinks the last time her right breast was imaged was 2024.     She denies any palpable masses, nipple discharge, or axillary adenopathy. She does not have family history of breast cancer. Her sister had a gynecological cancer recently and passed away from it in [place]. The patient does not have a history of genetic testing.          Past Medical History:    Hypothyroidism    Prediabetes       Past Surgical History:   Procedure Laterality Date    Hysterectomy  2023    partial hysterectomy       Gynecological History:  Menarche at age 9 and LMP unknown  Pt is a   Pt was 26 years old at time of first pregnancy.    She denies any cumulative breastfeeding history  Age of Menopause: 31  Type: surgical, hysterectomy  Her HRT use is unknown  Her OCP use is unknown  It is unknown if she has received fertility treatment    Medications:    No outpatient medications have been marked as taking for the 25 encounter (Office Visit) with Kurt Glover MD.       Allergies:    Allergies[1]    Family History:   History reviewed. No pertinent family history.    She is not of Ashkenazi Faith ancestry.    Social History:  History   Alcohol Use Never       History   Smoking Status    Never   Smokeless  Tobacco    Never       Review of Systems:    Review of Systems   Constitutional:  Negative for chills and fever.   HENT:  Negative for sore throat and trouble swallowing.    Eyes:  Negative for visual disturbance.   Respiratory:  Negative for cough, chest tightness and shortness of breath.    Cardiovascular:  Negative for chest pain, palpitations and leg swelling.   Gastrointestinal:  Negative for nausea, vomiting, abdominal pain, diarrhea, constipation and blood in stool.   Genitourinary:  Negative for dysuria, hematuria and difficulty urinating.   Skin:  Negative for rash.   Neurological:  Negative for numbness and headaches.      Otherwise as per HPI.    Physical Exam:    /76   Pulse 80   Wt 70.3 kg (155 lb)   BMI 32.40 kg/m²     Physical Exam  Vitals reviewed.   Constitutional:       Appearance: Normal appearance.   HENT:      Head: Normocephalic and atraumatic.   Eyes:      Extraocular Movements: Extraocular movements intact.      Pupils: Pupils are equal, round, and reactive to light.   Cardiovascular:      Rate and Rhythm: Normal rate.   Pulmonary:      Effort: Pulmonary effort is normal.   Chest:   Breasts:     Right: Normal. No mass, nipple discharge, skin change or tenderness.      Left: Normal. No mass, nipple discharge, skin change or tenderness.       Abdominal:      General: Abdomen is flat.      Palpations: Abdomen is soft.   Musculoskeletal:         General: Normal range of motion.      Cervical back: Normal range of motion and neck supple.   Lymphadenopathy:      Upper Body:      Right upper body: No supraclavicular or axillary adenopathy.      Left upper body: No supraclavicular or axillary adenopathy.   Skin:     General: Skin is warm and dry.   Neurological:      General: No focal deficit present.      Mental Status: She is alert and oriented to person, place, and time.   Psychiatric:         Mood and Affect: Mood normal.          Labs/imaging: reviewed in EPIC    Impression:   Ms.  Evon Diop is a 65 year old woman that presents for left breast pain.    Discussion and Plan:  I had a discussion with the Patient regarding her breast exam. On exam today I found no evidence of disease. I did recommend seeing dermatology for the skin lesion that concerns her.  I personally reviewed her recent imaging report and we discussed this.    We discussed the patient's breast pain. I explained that breast pain is usually secondary to hormonal shifts and not usually a sign of worrisome pathology. There were no masses on physical exam and her most recent imaging is reassuring. I explained that most of the time breast pain will dissipate with time. Some studies have shown that a good supportive bra, NSAIDs, and/or primrose oil can help with breast pain. We also discussed abstaining from caffeine. We discussed wearing a good, supportive, well-fitted bra in the correct size. If needed, the bra can be worn 24/7 for support. The patient may use NSAIDs as needed and primrose oil may be helpful.       Further imaging:   Mammogram: Next bilateral screening mammogram now, ordered   Ultrasound: Due to dense breast tissue, patient would benefit from bilateral whole breast screening ultrasound at 6 month intervals from mammogram. This has been ordered for October 2025.    Return to clinic: 1 year for exam or as needed    She was given ample opportunity for questions and those questions were answered to her satisfaction. She has been encouraged to contact the office with any questions or concerns. This encounter lasted a total of 55 minutes, more than 50% of which was dedicated to the discussion of management options.     Kurt Glover MD  Breast Surgical Oncology    CC: Dr. Gomes          [1] No Known Allergies

## 2025-05-08 NOTE — TELEPHONE ENCOUNTER
ENGLISH TRANSLATION OF [employer] MESSAGE SENT IS BELOW:  Osmin Evon,    For assistance with your symptoms, please call [phone] and say \"nurse\" to be connected to a registered nurse.   A phone call will allow our nurses to ask more questions and collect information about your situation so they can give you the best advice on how to proceed.   You are also free to schedule an appointment using the scheduling feature in your [facility] account or by calling [phone], say \"appointment\".    You may also ask for a .    For future reference, it can take up to 2 - 3 business days for us to review [facility] messages.      Our phone hours are:  Monday - Friday 8 AM - 4:30 PM  Saturday  Closed  Sunday   Closed    Thank you,   Adeline RN-BSN

## 2025-06-17 NOTE — TELEPHONE ENCOUNTER
Called patient  at  [phone] with [place]  [employer] [facility]  [facility]  ID# [id number]        Left Voicemail to call back our office. Office phone number provided with office telephone hours.      Patient did read MyChart to call office

## 2025-06-17 NOTE — TELEPHONE ENCOUNTER
My blood presure is high  (Newest Message First)             Evon Bowden P Em Rn Triage (supporting Marquita Gomes MD) (Selected Message)  AG      6/16/25 11:39 PM  Doctor, I've been feeling a little dizzy for couple days and my vision is a little blurry. I took my blood pressure, and it's 152/69 with a pulse of 59/min. I'd like you to check my blood pressure and prediabetes.   I dont know if I can may one stop in your office. To check it.

## 2025-06-18 NOTE — TELEPHONE ENCOUNTER
[employer] [place] Language Line: [facility] ID number [id number]. Left message to call back, 2nd attempt.

## 2025-06-18 NOTE — PROGRESS NOTES
Evon Diop is a 65 year old female.  Chief Complaint   Patient presents with    Vertigo    Blurred Vision    Abdominal Pain     On both legs      HPI:       The following individual(s) verbally consented to be recorded using ambient AI listening technology and understand that they can each withdraw their consent to this listening technology at any point by asking the clinician to turn off or pause the recording:    Patient name: Evon Diop is a 65 year old female with prediabetes and thyroid issues who presents with dizziness, headache, and neck swelling.    She has been experiencing dizziness since Monday, accompanied by blurry vision and a severe headache upon waking. Her blood pressure, measured at home by her , was noted to be 150/116. The dizziness is persistent but less severe than initially, with a sensation of being 'very dizzy' and blurry vision.    She reports a painful swelling on the left side of her neck, present for about a week. Initially, it was more painful and swollen, but it has since decreased in size. Her  confirmed feeling a bump in the area. No ear pain, but there is slight itching inside the ear.    She experiences frequent urination, sometimes with a burning sensation, and increased thirst, which she attributes to drinking more water. She is concerned about her prediabetes status, especially with her upcoming travel to [place].    She has insomnia, describing her sleep as irregular and often waking up after only five hours. She has tried using levocetirizine to help with sleep, which sometimes helps her relax and fall asleep.    She is currently taking thyroid medication but reports persistent symptoms such as hair loss. She also takes gabapentin occasionally for nerve pain, which she finds helpful when her symptoms are severe.    She has been taking vitamin D supplements, previously prescribed at 100 mcg, but currently taking  50 mcg twice daily. She reports feeling more energetic with this regimen.         Current Medications[1]   Past Medical History[2]   Past Surgical History[3]   Social History:  Short Social Hx on File[4]   Family History[5]   Allergies[6]     REVIEW OF SYSTEMS:   Review of Systems   Review of Systems   Constitutional: Negative for activity change, appetite change and fever.   HENT: Negative for congestion and voice change.    Respiratory: Negative for cough and shortness of breath.    Cardiovascular: Negative for chest pain.   Gastrointestinal: Negative for abdominal distention, abdominal pain and vomiting.   Genitourinary: Negative for hematuria.   Skin: Negative for wound.   Psychiatric/Behavioral: Negative for behavioral problems.   Wt Readings from Last 5 Encounters:   06/18/25 153 lb (69.4 kg)   04/08/25 155 lb (70.3 kg)   03/10/25 153 lb (69.4 kg)   01/16/25 153 lb (69.4 kg)   12/27/24 154 lb (69.9 kg)     Body mass index is 31.98 kg/m².      EXAM:   /76   Pulse 74   Ht 4' 10\" (1.473 m)   Wt 153 lb (69.4 kg)   BMI 31.98 kg/m²   Physical Exam   Constitutional:       Appearance: Normal appearance.   HENT:      Head: Normocephalic.   Eyes:      Conjunctiva/sclera: Conjunctivae normal.   Breast:  Normal bilateral breast exam. No palpable masses or nodules.   No nipples asymmetry or discharge. No skin changes   Cardiovascular:      Rate and Rhythm: Normal rate and regular rhythm.      Heart sounds: Normal heart sounds. No murmur heard.  Pulmonary:      Effort: Pulmonary effort is normal.      Breath sounds: Normal breath sounds. No rhonchi or rales.   Abdominal:      General: Bowel sounds are normal.      Palpations: Abdomen is soft.      Tenderness: There is no abdominal tenderness.   Musculoskeletal:      Cervical back: Neck supple.      Right lower leg: No edema.      Left lower leg: No edema.   Skin:     General: Skin is warm and dry.   Neurological:      General: No focal deficit present.      Mental  Status: He is alert and oriented to person, place, and time. Mental status is at baseline.   Psychiatric:         Mood and Affect: Mood normal.         Behavior: Behavior normal.       ASSESSMENT AND PLAN:        Dizziness and Blurred Vision  Acute onset of dizziness and blurred vision since Monday, associated with hypertension. Symptoms have slightly improved but persist. . She is traveling to [place] and is concerned about her health status during travel.  - Monitor blood pressure regularly  - Consider further evaluation if symptoms persist  - Ensure all medications are filled before travel  - Advise to monitor health status while traveling  - could also be secondary to a middle ear infection. Antibiotics given   - blood pressure normal at this time     Headache  headache associated with dizziness. She has been taking acetaminophen for relief.  - Continue acetaminophen as needed for headache relief  - antibiotics as below     Neck Swelling and Pain  Swelling and pain on the left side of the neck for one week, with a palpable bump in the submandibular area.  Possible infection suspected . Differential includes lymphadenopathy or infectious causes. She will start on antibiotics and a CT scan will be considered if symptoms persist after returning from [place].  - Start Augmentin twice a day with food  - Order CT scan of the neck if swelling persists after returning from [place]  - suspect sinusitis vs inner ear pathology     Urinary Symptoms/ frequent urination   Burning sensation during urination and increased frequency, but urinalysis shows no infection. Differential includes non-infectious causes such as interstitial cystitis or gynecological issues. She reports increased thirst and frequent urination, raising concern for diabetes progression.  - Order pelvic ultrasound to evaluate for uterine or other pelvic issues  - Order blood test to check current diabetic status    Thyroid Disorder/ hypothyroidism   She is on  thyroid medication but reports persistent symptoms such as hair loss and muscle cramps, indicating possible inadequate control.  - Monitor thyroid function tests  - Adjust thyroid medication if necessary    Insomnia  Chronic insomnia with difficulty falling and staying asleep, resulting in less than five hours of sleep per night. She has used levocetirizine for relief but is concerned about dependency. Melatonin suggested as a non-addictive alternative.  - Recommend melatonin 3 mg over the counter for sleep    Vitamin D Deficiency/ Osteopenia  She has been taking 4000 IU of Vitamin D daily. Reports feeling better with higher dose. Will check levels to adjust supplementation appropriately.  - Check vitamin D levels with blood test  - Adjust Vitamin D supplementation based on results    Neuropathic Pain  Intermittent neuropathic pain managed with gabapentin as needed. She reports relief with current regimen.  - Prescribe gabapentin to be sent to [facility]  - was taking gabapentin from previous doctor for neuropathic headaches    Follow-up  She is traveling to [place] and will return on July 7-8. Follow-up needed if symptoms persist.  - Schedule follow-up appointment after return from [place]  - Perform CT scan of neck if swelling persists after return    The patient indicates understanding of these issues and agrees to the plan.  No follow-ups on file.    Marquita Gomes MD     This note was created by Dragon voice recognition. Errors in content may be related to improper recognition by the system; efforts to review and correct have been done but errors may still exist. Please be advised the primary purpose of this note is for me to communicate medical care. Standard sentence structure is not always used. Medical terminology and medical abbreviations may be used. There may be grammatical, typographical, and automated fill ins that may have errors missed in proofreading.          [1]   Current Outpatient Medications    Medication Sig Dispense Refill    levocetirizine 5 MG Oral Tab Take 1 tablet (5 mg total) by mouth every evening.      nystatin 100,000 Units/g External Cream Apply a small amount twice a day. 30 g 0    levothyroxine 50 MCG Oral Tab Take 1 tablet (50 mcg total) by mouth before breakfast. 90 tablet 3    Cholecalciferol 50 MCG (2000 UT) Oral Tab Take 1 tablet (2,000 Units total) by mouth daily. 90 tablet 3    docusate sodium 100 MG Oral Cap Take 1 capsule (100 mg total) by mouth 2 (two) times daily as needed for constipation. 30 capsule 0    pantoprazole 40 MG Oral Tab EC Take 1 tablet (40 mg total) by mouth every morning before breakfast. 90 tablet 3    fluticasone propionate 50 MCG/ACT Nasal Suspension 2 sprays by Nasal route daily. 48 g 3    cetirizine 10 MG Oral Tab Take 1 tablet (10 mg total) by mouth daily. (Patient not taking: Reported on 6/18/2025) 30 tablet 0    levothyroxine 25 MCG Oral Tab Take 1 tablet (25 mcg total) by mouth before breakfast. (Patient not taking: Reported on 6/18/2025) 30 tablet 0   [2]   Past Medical History:   Hypothyroidism    Prediabetes   [3]   Past Surgical History:  Procedure Laterality Date    Hysterectomy  06/28/2023    partial hysterectomy   [4]   Social History  Socioeconomic History    Marital status: Unknown   Tobacco Use    Smoking status: Never    Smokeless tobacco: Never   Vaping Use    Vaping status: Never Used   Substance and Sexual Activity    Alcohol use: Never    Drug use: Never   [5] History reviewed. No pertinent family history.  [6] No Known Allergies

## (undated) NOTE — LETTER
6/19/2025    Evon Diop  [address]  [facility] [address]         Dear Evon,    This letter is to inform you that our office has made several attempts to reach you by phone without success.  We were attempting to contact you by phone regarding your symptoms     Please contact our office at the number listed below as soon as you receive this letter to discuss this issue and to make the necessary changes in our system to your contact information.  Thank you for your cooperation.        Sincerely,    Marquita Gomes MD  [address] [place] [address]  Ph: [phone]  Fax: [phone]         Document electronically generated by:  Dacia HOLLOWAY RN

## (undated) NOTE — LETTER
November 9, 2021         David Dobbs MD  [address]  [facility]      Patient: Lisset Santillan   YOB: 1960   Date of Visit: 11/9/2021       Dear Dr. Jose Hall MD,    I saw your patient, Lisset Santillan, on 11/9/2021.  Enclosed is my con

## (undated) NOTE — Clinical Note
Thank you for allowing me to care for your patient! I have ordered a screening mammogram and copied you on results. I am not able to see her previous films so my team will work on that, but the patient told me she should be due. Due to dense breast tissue, patient would benefit from bilateral whole breast screening ultrasound at 6 month intervals from mammogram. This has been ordered and I copied you on results. She can come see me as needed, or in 1 year if she needs a clinical breast exam. Thanks, Kurt Glover